# Patient Record
Sex: FEMALE | URBAN - METROPOLITAN AREA
[De-identification: names, ages, dates, MRNs, and addresses within clinical notes are randomized per-mention and may not be internally consistent; named-entity substitution may affect disease eponyms.]

---

## 2020-12-04 ENCOUNTER — APPOINTMENT (RX ONLY)
Dept: URBAN - METROPOLITAN AREA CLINIC 164 | Facility: CLINIC | Age: 40
Setting detail: DERMATOLOGY
End: 2020-12-04

## 2020-12-04 DIAGNOSIS — L73.2 HIDRADENITIS SUPPURATIVA: ICD-10-CM

## 2020-12-04 PROCEDURE — ? ADDITIONAL NOTES

## 2020-12-04 PROCEDURE — ? MEDICATION COUNSELING

## 2020-12-04 PROCEDURE — 99202 OFFICE O/P NEW SF 15 MIN: CPT

## 2020-12-04 PROCEDURE — ? COUNSELING

## 2020-12-04 PROCEDURE — ? ORDER TESTS

## 2020-12-04 ASSESSMENT — LOCATION SIMPLE DESCRIPTION DERM
LOCATION SIMPLE: LEFT AXILLARY VAULT
LOCATION SIMPLE: RIGHT AXILLARY VAULT

## 2020-12-04 ASSESSMENT — LOCATION ZONE DERM: LOCATION ZONE: AXILLAE

## 2020-12-04 ASSESSMENT — LOCATION DETAILED DESCRIPTION DERM
LOCATION DETAILED: LEFT AXILLARY VAULT
LOCATION DETAILED: RIGHT AXILLARY VAULT

## 2020-12-04 NOTE — HPI: RASH (HIDRADENITIS SUPPURATIVA)
How Severe Is It?: moderate
Is This A New Presentation, Or A Follow-Up?: Rash
Additional History: Had excisions done in October 2020 to clean out axillae areas. Relief only lasted 3 days.  No previous biological previously.

## 2020-12-04 NOTE — PROCEDURE: ORDER TESTS
Billing Type: Third-Party Bill
Expected Date Of Service: 12/04/2020
Performing Laboratory: [id number]
Bill For Surgical Tray: no

## 2020-12-04 NOTE — PROCEDURE: ADDITIONAL NOTES
Detail Level: Simple
Additional Notes: 2 months post surgery in bilateral axilla
Additional Notes: Will check with hematologist if okay with patient starting Humira

## 2022-09-09 NOTE — PROCEDURE: MEDICATION COUNSELING
0 Azithromycin Counseling:  I discussed with the patient the risks of azithromycin including but not limited to GI upset, allergic reaction, drug rash, diarrhea, and yeast infections.

## 2025-02-20 NOTE — PROCEDURE: MEDICATION COUNSELING
Pharmacy Medication History Review    Maciej Noyola is a 42 y.o. female. Pharmacy reviewed the patient's [id number] medications and allergies for accuracy.    Medications ADDED:  Theanine oral  Ferrous sulfate  Folic acid  Medications CHANGED:  Sarna - not taking  Lotrisone - not taking   Triamcinolone cream 0.1% - not taking   Vitamin B-12 500mcg - three times weekly  Omega 3 capsules Vegan   Quelbree 100mg - once daily  [place] wart - 2 daily  Medications REMOVED:   None     The list below reflects the updated PTA list. Comments regarding how patient may be taking medications differently can be found in the Admit Orders Activity  Prior to Admission Medications   Prescriptions Last Dose Informant   Qelbree 100 mg capsule,extended release 24hr 2/20/2025 Self   Sig: Take 1 capsule (100 mg) by mouth once daily.   True's wort 300 mg capsule 2/20/2025 Self   Sig: Take 2 capsules by mouth once daily.   THEANINE ORAL 2/20/2025 Self   Sig: Take 1 each by mouth once daily.   cyanocobalamin (Vitamin B-12) 500 mcg tablet Past Week Self   Sig: Take 1 tablet (500 mcg) by mouth 3 times a week.   ergocalciferol (Vitamin D-2) 1.25 MG (02458 UT) capsule Past Week Self   Sig: TAKE 1 CAPSULE ONCE WEEKLY   ferrous gluconate (Fergon) 324 (38 Fe) mg tablet Past Week Self   Sig: Take 1 tablet (38 mg of iron) by mouth 2 times a week.   folic acid (Folvite) 800 mcg tablet Past Week Self   Sig: Take 1 tablet (0.8 mg) by mouth 2 times a week.   nitrofurantoin, macrocrystal-monohydrate, (Macrobid) 100 mg capsule 2/20/2025 Self   Sig: Take 1 capsule (100 mg) by mouth 2 times a day for 7 days.   omega-3s/dha/epa/algal oil (OMEGA-3 1300 VEGAN ORAL) 2/20/2025 Self   Sig: Take 1 each by mouth once daily.   omeprazole (PriLOSEC) 40 mg DR capsule 2/20/2025 Self   Sig: TAKE 1 CAPSULE DAILY IN THE MORNING BEFORE A MEAL (DO NOT CRUSH OR CHEW)      Facility-Administered Medications: None        The list below reflects the updated allergy  list. Please review each documented allergy for additional clarification and justification.  Allergies  Reviewed by Alejandra Grover CPhT on 2/20/2025   No Known Allergies         Pharmacy has been updated to [facility] [place]    Sources used to complete the med history include dispense history, PTA medication list, patient interview. Patient is a fair historian.    Below are additional concerns with the patient's PTA list.  None     Alejandra Grover CPhT-Adv  Please reach out via [employer] Secure Chat for questions     Clindamycin Counseling: I counseled the patient regarding use of clindamycin as an antibiotic for prophylactic and/or therapeutic purposes. Clindamycin is active against numerous classes of bacteria, including skin bacteria. Side effects may include nausea, diarrhea, gastrointestinal upset, rash, hives, yeast infections, and in rare cases, colitis.